# Patient Record
Sex: FEMALE | Race: WHITE | NOT HISPANIC OR LATINO
[De-identification: names, ages, dates, MRNs, and addresses within clinical notes are randomized per-mention and may not be internally consistent; named-entity substitution may affect disease eponyms.]

---

## 2017-03-22 ENCOUNTER — APPOINTMENT (OUTPATIENT)
Dept: ELECTROPHYSIOLOGY | Facility: CLINIC | Age: 47
End: 2017-03-22

## 2017-03-22 ENCOUNTER — NON-APPOINTMENT (OUTPATIENT)
Age: 47
End: 2017-03-22

## 2017-03-22 VITALS
HEART RATE: 85 BPM | HEIGHT: 60 IN | WEIGHT: 145 LBS | SYSTOLIC BLOOD PRESSURE: 108 MMHG | BODY MASS INDEX: 28.47 KG/M2 | DIASTOLIC BLOOD PRESSURE: 69 MMHG

## 2017-03-22 DIAGNOSIS — F17.200 NICOTINE DEPENDENCE, UNSPECIFIED, UNCOMPLICATED: ICD-10-CM

## 2017-03-22 RX ORDER — DOXYCYCLINE HYCLATE 100 MG/1
100 CAPSULE ORAL
Qty: 20 | Refills: 0 | Status: COMPLETED | COMMUNITY
Start: 2016-09-16

## 2017-03-22 RX ORDER — AZITHROMYCIN 250 MG/1
250 TABLET, FILM COATED ORAL
Qty: 6 | Refills: 0 | Status: COMPLETED | COMMUNITY
Start: 2016-10-27

## 2017-03-22 RX ORDER — CLINDAMYCIN HYDROCHLORIDE 300 MG/1
300 CAPSULE ORAL
Qty: 21 | Refills: 0 | Status: COMPLETED | COMMUNITY
Start: 2017-02-02

## 2017-03-22 RX ORDER — LEVOTHYROXINE SODIUM 100 UG/1
100 TABLET ORAL DAILY
Refills: 0 | Status: ACTIVE | COMMUNITY
Start: 2017-03-22

## 2017-03-22 RX ORDER — CLINDAMYCIN HYDROCHLORIDE 150 MG/1
150 CAPSULE ORAL
Qty: 28 | Refills: 0 | Status: COMPLETED | COMMUNITY
Start: 2017-03-17

## 2017-03-22 RX ORDER — IPRATROPIUM BROMIDE 21 UG/1
0.03 SPRAY NASAL
Qty: 30 | Refills: 0 | Status: COMPLETED | COMMUNITY
Start: 2016-10-27

## 2017-03-22 RX ORDER — MUPIROCIN 20 MG/G
2 OINTMENT TOPICAL
Qty: 22 | Refills: 0 | Status: COMPLETED | COMMUNITY
Start: 2016-09-16

## 2017-10-11 ENCOUNTER — APPOINTMENT (OUTPATIENT)
Dept: MRI IMAGING | Facility: CLINIC | Age: 47
End: 2017-10-11

## 2017-10-23 ENCOUNTER — OUTPATIENT (OUTPATIENT)
Dept: OUTPATIENT SERVICES | Facility: HOSPITAL | Age: 47
LOS: 1 days | End: 2017-10-23
Payer: MEDICAID

## 2017-10-23 VITALS
SYSTOLIC BLOOD PRESSURE: 98 MMHG | HEART RATE: 82 BPM | RESPIRATION RATE: 16 BRPM | DIASTOLIC BLOOD PRESSURE: 56 MMHG | HEIGHT: 60 IN | WEIGHT: 138.01 LBS | OXYGEN SATURATION: 97 % | TEMPERATURE: 99 F

## 2017-10-23 DIAGNOSIS — R55 SYNCOPE AND COLLAPSE: ICD-10-CM

## 2017-10-23 DIAGNOSIS — Z92.3 PERSONAL HISTORY OF IRRADIATION: Chronic | ICD-10-CM

## 2017-10-23 PROCEDURE — 33282: CPT

## 2017-10-23 PROCEDURE — C1764: CPT

## 2017-10-23 PROCEDURE — 93010 ELECTROCARDIOGRAM REPORT: CPT

## 2017-10-23 PROCEDURE — 93005 ELECTROCARDIOGRAM TRACING: CPT

## 2017-10-23 NOTE — H&P CARDIOLOGY - HISTORY OF PRESENT ILLNESS
This is a 45 yo woman with Graves disease, Anxiety who presents today for a loop recorder. The patient reports she has been experiencing palpitations over the last few months. She experiences cp, dizziness,  and HA with the  palpitations.   At times the palpitations last a few minutes and other for a few hours.  The patient denies syncope but has episodes of near syncope. She was seen and evaluated by her cardiologist Dr Cerna and event monitor and Holter monitor placed it showed  irreg rhythme. She is now here for a loop recorder This is a 45 yo woman with Graves disease with hx of Thyroid ablation ,  Anxiety who presents today for a loop recorder. The patient reports she has been experiencing palpitations over the last few months. She experiences cp, dizziness,  and HA with the  palpitations.   At times the palpitations last a few minutes and other for a few hours.  The patient denies syncope but has episodes of near syncope. She was seen and evaluated by her cardiologist Dr Cerna and event monitor and Holter monitor placed it showed  irreg rhythm as per patient and she was referred to Dr Reed for further evaluation  She is now here for a loop recorder

## 2017-10-23 NOTE — H&P CARDIOLOGY - PMH
Allergic Asthma    Anxiety Disorder    Blindness of One Eye  left  Grave's Disease    Hypothyroid    Missed     Obesity    Von Willebrand's Disease

## 2017-10-23 NOTE — H&P CARDIOLOGY - PSH
S/P Appendectomy    S/P Laparoscopic Cholecystectomy  delivery delivered    S/P Appendectomy    S/P Laparoscopic Cholecystectomy    Status post radioactive iodine thyroid ablation

## 2017-11-01 ENCOUNTER — APPOINTMENT (OUTPATIENT)
Dept: ELECTROPHYSIOLOGY | Facility: CLINIC | Age: 47
End: 2017-11-01
Payer: COMMERCIAL

## 2017-11-01 VITALS
OXYGEN SATURATION: 99 % | BODY MASS INDEX: 27.48 KG/M2 | SYSTOLIC BLOOD PRESSURE: 113 MMHG | WEIGHT: 140 LBS | HEART RATE: 73 BPM | DIASTOLIC BLOOD PRESSURE: 69 MMHG | HEIGHT: 60 IN

## 2017-11-01 PROCEDURE — 99024 POSTOP FOLLOW-UP VISIT: CPT

## 2017-12-12 ENCOUNTER — APPOINTMENT (OUTPATIENT)
Dept: ELECTROPHYSIOLOGY | Facility: CLINIC | Age: 47
End: 2017-12-12
Payer: COMMERCIAL

## 2017-12-12 PROCEDURE — 93298 REM INTERROG DEV EVAL SCRMS: CPT

## 2018-01-16 ENCOUNTER — APPOINTMENT (OUTPATIENT)
Dept: ELECTROPHYSIOLOGY | Facility: CLINIC | Age: 48
End: 2018-01-16
Payer: COMMERCIAL

## 2018-01-16 PROCEDURE — 93298 REM INTERROG DEV EVAL SCRMS: CPT

## 2018-02-13 ENCOUNTER — TRANSCRIPTION ENCOUNTER (OUTPATIENT)
Age: 48
End: 2018-02-13

## 2018-02-13 ENCOUNTER — APPOINTMENT (OUTPATIENT)
Dept: VASCULAR SURGERY | Facility: CLINIC | Age: 48
End: 2018-02-13
Payer: MEDICAID

## 2018-02-13 VITALS
HEART RATE: 70 BPM | WEIGHT: 140 LBS | HEIGHT: 60 IN | TEMPERATURE: 98.1 F | SYSTOLIC BLOOD PRESSURE: 117 MMHG | DIASTOLIC BLOOD PRESSURE: 76 MMHG | BODY MASS INDEX: 27.48 KG/M2

## 2018-02-13 PROCEDURE — 93970 EXTREMITY STUDY: CPT

## 2018-02-13 PROCEDURE — 99406 BEHAV CHNG SMOKING 3-10 MIN: CPT

## 2018-02-13 PROCEDURE — 99204 OFFICE O/P NEW MOD 45 MIN: CPT | Mod: 25

## 2018-02-14 ENCOUNTER — APPOINTMENT (OUTPATIENT)
Dept: ELECTROPHYSIOLOGY | Facility: CLINIC | Age: 48
End: 2018-02-14
Payer: COMMERCIAL

## 2018-02-14 VITALS
HEIGHT: 60 IN | BODY MASS INDEX: 27.88 KG/M2 | OXYGEN SATURATION: 99 % | DIASTOLIC BLOOD PRESSURE: 69 MMHG | HEART RATE: 72 BPM | SYSTOLIC BLOOD PRESSURE: 105 MMHG | WEIGHT: 142 LBS

## 2018-02-14 PROCEDURE — 93285 PRGRMG DEV EVAL SCRMS IP: CPT

## 2018-03-13 ENCOUNTER — APPOINTMENT (OUTPATIENT)
Dept: ELECTROPHYSIOLOGY | Facility: CLINIC | Age: 48
End: 2018-03-13
Payer: COMMERCIAL

## 2018-03-13 DIAGNOSIS — Z00.00 ENCOUNTER FOR GENERAL ADULT MEDICAL EXAMINATION W/OUT ABNORMAL FINDINGS: ICD-10-CM

## 2018-03-13 PROCEDURE — 93298 REM INTERROG DEV EVAL SCRMS: CPT

## 2018-04-17 ENCOUNTER — APPOINTMENT (OUTPATIENT)
Dept: ELECTROPHYSIOLOGY | Facility: CLINIC | Age: 48
End: 2018-04-17
Payer: COMMERCIAL

## 2018-04-17 PROCEDURE — 93298 REM INTERROG DEV EVAL SCRMS: CPT

## 2018-05-16 ENCOUNTER — APPOINTMENT (OUTPATIENT)
Dept: ELECTROPHYSIOLOGY | Facility: CLINIC | Age: 48
End: 2018-05-16
Payer: COMMERCIAL

## 2018-05-16 VITALS — OXYGEN SATURATION: 99 % | HEART RATE: 72 BPM | DIASTOLIC BLOOD PRESSURE: 73 MMHG | SYSTOLIC BLOOD PRESSURE: 113 MMHG

## 2018-05-16 PROCEDURE — 93285 PRGRMG DEV EVAL SCRMS IP: CPT

## 2018-05-31 ENCOUNTER — APPOINTMENT (OUTPATIENT)
Dept: ELECTROPHYSIOLOGY | Facility: CLINIC | Age: 48
End: 2018-05-31
Payer: COMMERCIAL

## 2018-05-31 PROCEDURE — 93298 REM INTERROG DEV EVAL SCRMS: CPT

## 2018-05-31 PROCEDURE — 93299: CPT

## 2018-07-30 ENCOUNTER — APPOINTMENT (OUTPATIENT)
Dept: ELECTROPHYSIOLOGY | Facility: CLINIC | Age: 48
End: 2018-07-30
Payer: COMMERCIAL

## 2018-07-30 PROCEDURE — 93299: CPT

## 2018-07-30 PROCEDURE — 93298 REM INTERROG DEV EVAL SCRMS: CPT

## 2018-08-02 PROBLEM — E03.9 HYPOTHYROIDISM, UNSPECIFIED: Chronic | Status: ACTIVE | Noted: 2017-10-23

## 2018-09-04 ENCOUNTER — APPOINTMENT (OUTPATIENT)
Dept: ELECTROPHYSIOLOGY | Facility: CLINIC | Age: 48
End: 2018-09-04
Payer: COMMERCIAL

## 2018-09-04 PROCEDURE — 93299: CPT

## 2018-09-04 PROCEDURE — 93298 REM INTERROG DEV EVAL SCRMS: CPT

## 2018-10-05 ENCOUNTER — APPOINTMENT (OUTPATIENT)
Dept: ELECTROPHYSIOLOGY | Facility: CLINIC | Age: 48
End: 2018-10-05
Payer: COMMERCIAL

## 2018-10-05 PROCEDURE — 93299: CPT

## 2018-10-05 PROCEDURE — 93298 REM INTERROG DEV EVAL SCRMS: CPT

## 2018-11-15 ENCOUNTER — APPOINTMENT (OUTPATIENT)
Dept: ELECTROPHYSIOLOGY | Facility: CLINIC | Age: 48
End: 2018-11-15
Payer: COMMERCIAL

## 2018-11-15 PROCEDURE — XXXXX: CPT

## 2018-12-05 ENCOUNTER — APPOINTMENT (OUTPATIENT)
Dept: OTOLARYNGOLOGY | Facility: CLINIC | Age: 48
End: 2018-12-05
Payer: MEDICAID

## 2018-12-05 VITALS
BODY MASS INDEX: 27.88 KG/M2 | HEART RATE: 83 BPM | WEIGHT: 142 LBS | SYSTOLIC BLOOD PRESSURE: 126 MMHG | HEIGHT: 60 IN | DIASTOLIC BLOOD PRESSURE: 72 MMHG

## 2018-12-05 DIAGNOSIS — H90.3 SENSORINEURAL HEARING LOSS, BILATERAL: ICD-10-CM

## 2018-12-05 DIAGNOSIS — M26.629 ARTHRALGIA OF TEMPOROMANDIBULAR JOINT,: ICD-10-CM

## 2018-12-05 DIAGNOSIS — J34.2 DEVIATED NASAL SEPTUM: ICD-10-CM

## 2018-12-05 DIAGNOSIS — J31.0 CHRONIC RHINITIS: ICD-10-CM

## 2018-12-05 DIAGNOSIS — H69.83 OTHER SPECIFIED DISORDERS OF EUSTACHIAN TUBE, BILATERAL: ICD-10-CM

## 2018-12-05 DIAGNOSIS — J32.9 CHRONIC SINUSITIS, UNSPECIFIED: ICD-10-CM

## 2018-12-05 DIAGNOSIS — J40 CHRONIC SINUSITIS, UNSPECIFIED: ICD-10-CM

## 2018-12-05 PROCEDURE — 92567 TYMPANOMETRY: CPT

## 2018-12-05 PROCEDURE — 92557 COMPREHENSIVE HEARING TEST: CPT

## 2018-12-05 PROCEDURE — 99204 OFFICE O/P NEW MOD 45 MIN: CPT | Mod: 25

## 2018-12-05 PROCEDURE — 31231 NASAL ENDOSCOPY DX: CPT

## 2018-12-05 RX ORDER — CETIRIZINE HCL 10 MG
TABLET ORAL
Refills: 0 | Status: ACTIVE | COMMUNITY

## 2018-12-31 ENCOUNTER — APPOINTMENT (OUTPATIENT)
Dept: OTOLARYNGOLOGY | Facility: CLINIC | Age: 48
End: 2018-12-31

## 2019-01-28 ENCOUNTER — APPOINTMENT (OUTPATIENT)
Dept: ELECTROPHYSIOLOGY | Facility: CLINIC | Age: 49
End: 2019-01-28
Payer: MEDICAID

## 2019-01-28 VITALS
SYSTOLIC BLOOD PRESSURE: 123 MMHG | BODY MASS INDEX: 28.47 KG/M2 | HEIGHT: 60 IN | WEIGHT: 145 LBS | OXYGEN SATURATION: 97 % | HEART RATE: 75 BPM | DIASTOLIC BLOOD PRESSURE: 72 MMHG

## 2019-01-28 PROCEDURE — 99214 OFFICE O/P EST MOD 30 MIN: CPT

## 2019-01-28 PROCEDURE — 93000 ELECTROCARDIOGRAM COMPLETE: CPT

## 2019-01-29 NOTE — PHYSICAL EXAM
[General Appearance - Well Developed] : well developed [General Appearance - Well Nourished] : well nourished [Normal Conjunctiva] : the conjunctiva exhibited no abnormalities [No Oral Pallor] : no oral pallor [No Oral Cyanosis] : no oral cyanosis [Normal Jugular Venous V Waves Present] : normal jugular venous V waves present [Respiration, Rhythm And Depth] : normal respiratory rhythm and effort [Exaggerated Use Of Accessory Muscles For Inspiration] : no accessory muscle use [Auscultation Breath Sounds / Voice Sounds] : lungs were clear to auscultation bilaterally [Heart Rate And Rhythm] : heart rate and rhythm were normal [Heart Sounds] : normal S1 and S2 [Murmurs] : no murmurs present [Abdomen Soft] : soft [Abdomen Tenderness] : non-tender [Abdomen Mass (___ Cm)] : no abdominal mass palpated [Abnormal Walk] : normal gait [Nail Clubbing] : no clubbing of the fingernails [Cyanosis, Localized] : no localized cyanosis [Skin Color & Pigmentation] : normal skin color and pigmentation [] : no rash [Oriented To Time, Place, And Person] : oriented to person, place, and time [No Anxiety] : not feeling anxious [FreeTextEntry1] : 2+ carotids

## 2019-01-29 NOTE — DISCUSSION/SUMMARY
[FreeTextEntry1] : Ms. Orozco is a 46 year old female with a history of thyroid disease and palpitations. Symptoms and findings on ILR are most consistent with a autonomic etiology, most likely POTS. I have recommended a trial of Inderal. She has had the ILR since 2017 with frequent symptoms but only findings of intermittent sinus tachycardia. As the device causes her continued discomfort I was agreeable to its removal. She will return for explant in the coming weeks.\par \par I appreciate being a part of her care.

## 2019-01-29 NOTE — HISTORY OF PRESENT ILLNESS
[FreeTextEntry1] : Dear Dr. Bullock, \par \par As you know, Ms. Orozco is a 46 year old female with a history of hyperthyroidism status post ablation and palpitations. She notes dizziness and pre-syncope with her palpitations. It happens while driving. She does not drink alcohol and has two cups of coffee in the morning. Walking triggers it sometimes. She often gets dizzy when standing up. She notes chest pain with the palpitations.. Her episodes last a few minutes at a time. It occurs 10-11 times a day. \par \par She underwent ILR implantation which shows only intermittent episodes of sinus tachycardia that correlate with her palpitations. She complains that the loop recorder causes her discomfort and is requesting that it be removed.

## 2019-03-05 ENCOUNTER — APPOINTMENT (OUTPATIENT)
Dept: ELECTROPHYSIOLOGY | Facility: CLINIC | Age: 49
End: 2019-03-05
Payer: MEDICAID

## 2019-03-05 PROCEDURE — XXXXX: CPT

## 2019-11-19 ENCOUNTER — TRANSCRIPTION ENCOUNTER (OUTPATIENT)
Age: 49
End: 2019-11-19

## 2019-11-19 ENCOUNTER — APPOINTMENT (OUTPATIENT)
Dept: ELECTROPHYSIOLOGY | Facility: CLINIC | Age: 49
End: 2019-11-19
Payer: MEDICAID

## 2019-11-19 ENCOUNTER — APPOINTMENT (OUTPATIENT)
Dept: ELECTROPHYSIOLOGY | Facility: CLINIC | Age: 49
End: 2019-11-19

## 2019-11-19 VITALS
HEIGHT: 60 IN | DIASTOLIC BLOOD PRESSURE: 72 MMHG | WEIGHT: 167 LBS | OXYGEN SATURATION: 97 % | HEART RATE: 75 BPM | BODY MASS INDEX: 32.79 KG/M2 | SYSTOLIC BLOOD PRESSURE: 113 MMHG

## 2019-11-19 DIAGNOSIS — R00.2 PALPITATIONS: ICD-10-CM

## 2019-11-19 DIAGNOSIS — I47.1 SUPRAVENTRICULAR TACHYCARDIA: ICD-10-CM

## 2019-11-19 PROCEDURE — 99214 OFFICE O/P EST MOD 30 MIN: CPT

## 2019-11-19 PROCEDURE — 93000 ELECTROCARDIOGRAM COMPLETE: CPT

## 2019-11-19 RX ORDER — PROPRANOLOL HYDROCHLORIDE 60 MG/1
60 CAPSULE, EXTENDED RELEASE ORAL AT BEDTIME
Qty: 30 | Refills: 5 | Status: DISCONTINUED | COMMUNITY
Start: 2019-01-28 | End: 2019-11-19

## 2019-11-19 NOTE — PHYSICAL EXAM
[General Appearance - Well Developed] : well developed [Normal Appearance] : normal appearance [Well Groomed] : well groomed [No Deformities] : no deformities [General Appearance - Well Nourished] : well nourished [General Appearance - In No Acute Distress] : no acute distress [Eyelids - No Xanthelasma] : the eyelids demonstrated no xanthelasmas [Normal Conjunctiva] : the conjunctiva exhibited no abnormalities [Normal Oral Mucosa] : normal oral mucosa [No Oral Cyanosis] : no oral cyanosis [No Oral Pallor] : no oral pallor [Normal Jugular Venous A Waves Present] : normal jugular venous A waves present [No Jugular Venous Norton A Waves] : no jugular venous norton A waves [Normal Jugular Venous V Waves Present] : normal jugular venous V waves present [Rhythm Regular] : regular [Normal] : normal [Normal Rate] : normal [Normal S2] : normal S2 [Normal S1] : normal S1 [Exaggerated Use Of Accessory Muscles For Inspiration] : no accessory muscle use [Respiration, Rhythm And Depth] : normal respiratory rhythm and effort [Auscultation Breath Sounds / Voice Sounds] : lungs were clear to auscultation bilaterally [Abdomen Soft] : soft [Abdomen Mass (___ Cm)] : no abdominal mass palpated [Abdomen Tenderness] : non-tender [Gait - Sufficient For Exercise Testing] : the gait was sufficient for exercise testing [Nail Clubbing] : no clubbing of the fingernails [Abnormal Walk] : normal gait [Petechial Hemorrhages (___cm)] : no petechial hemorrhages [Cyanosis, Localized] : no localized cyanosis [Skin Color & Pigmentation] : normal skin color and pigmentation [No Venous Stasis] : no venous stasis [] : no rash [Skin Lesions] : no skin lesions [No Xanthoma] : no  xanthoma was observed [No Skin Ulcers] : no skin ulcer [Mood] : the mood was normal [Affect] : the affect was normal [Oriented To Time, Place, And Person] : oriented to person, place, and time [No Anxiety] : not feeling anxious

## 2019-11-21 NOTE — HISTORY OF PRESENT ILLNESS
[FreeTextEntry1] : Dear Dr. Bullock, \par \par I saw Ms. Orozco in consultation on 11/19/19. \par She is a pleasant 48 yr old with past medical history of hyperthyroidism s/p ablation of the thyroid and palpitations s/p ILR ( 10/2017) . She reports frequent episodes of palpitations associated with dizziness, lightheadedness These episodes occur at all different times of the day and night without prodrome. These episodes lasts for a minute or two. She usually shakes her body to terminate the symptom. Interrogation of the ILR showed multiple brief AT with ventricular rates ~ 214 bpm lasting up to 2:56 seconds. \par

## 2019-11-21 NOTE — DISCUSSION/SUMMARY
[Patient] : the patient [FreeTextEntry1] : In summary, Ms. Acuña is a 48 yr old with past medical history of hyperthyroidism s/p ablation and palpitations s/p ILR ( 10/2017) . She reports frequent episodes of palpitations associated with dizziness, lightheadedness These episodes occur at all different times of the day and night without prodrome. These episodes lasts for a minute or two. She usually shakes her body to terminate the symptom. Interrogation of the ILR showed multiple brief AT with ventricular rates ~ 214 bpm lasting up to 2:56 seconds. \par Recommend a catheter ablation of the atrial tachycardia. \par The risks indications, alternatives to the procedure were discussed with the patient. Risks discussed include but not limited to : bleeding, infection, vascular injury and potential vascular surgery, myocardial infarction, disabling stroke, pericardiac tamponade, pneumothorax, deep vein thrombosis, , pulmonary embolism, emergent bypass and death.\par All of their questions were answered and  agrees to proceed with the procedure. \par \par I appreciate the opportunity to be involved in her care.\par \par Sincerely,\par \par Tyler Parker MD

## 2019-12-02 ENCOUNTER — NON-APPOINTMENT (OUTPATIENT)
Age: 49
End: 2019-12-02

## 2019-12-27 ENCOUNTER — APPOINTMENT (OUTPATIENT)
Dept: ELECTROPHYSIOLOGY | Facility: CLINIC | Age: 49
End: 2019-12-27

## 2020-09-28 PROBLEM — Z00.00 ENCOUNTER FOR PREVENTIVE HEALTH EXAMINATION: Noted: 2020-09-28

## 2020-10-02 ENCOUNTER — APPOINTMENT (OUTPATIENT)
Dept: ELECTROPHYSIOLOGY | Facility: CLINIC | Age: 50
End: 2020-10-02

## 2020-10-02 ENCOUNTER — APPOINTMENT (OUTPATIENT)
Dept: ELECTROPHYSIOLOGY | Facility: CLINIC | Age: 50
End: 2020-10-02
Payer: MEDICAID

## 2020-10-02 PROCEDURE — 99214 OFFICE O/P EST MOD 30 MIN: CPT | Mod: 95

## 2020-10-02 NOTE — PLAN
[FreeTextEntry1] : In summary Alejandra Orozco is a 49 yr old with past medical history of hyperthyroidism s/p ablation of the thyroid and palpitations with at least one episode of syncope and multiple episodes of near syncope s/p ILR (10/2017) and now she requested a Telehealth.  ILR has showed episodes of rapid PAT.  Ablation of the PAT was considered in past but not performed. Bystolic 5 mg resulting in hypotension in the past.  Will try metoprolol succinate 12.5 mg at night.  Also patient will have ILR replaced and moved to another location due to the device approaching ARTIS and current device causing pain.  I recommended that the patient wait until device reach EOL before replacing, but pain at the site might push her to having the device moved/replaced sooner.  Risks, benefits alternatives discussed. \par \par Tyler Parker MD

## 2020-10-02 NOTE — HISTORY OF PRESENT ILLNESS
[Home] : at home, [unfilled] , at the time of the visit. [Medical Office: ([facility])___] : at the medical office located in  [Verbal consent obtained from patient] : the patient, [unfilled] [Time Spent: ___ minutes] : I have spent [unfilled] minutes with the patient on the telephone [FreeTextEntry1] : Rick and Sterling Bullock MD\par \par Alejandra Orozco is a 49 yr old with past medical history of hyperthyroidism s/p ablation of the thyroid and palpitations s/p ILR ( 10/2017) . She reports frequent episodes of palpitations associated with dizziness, lightheadedness These episodes occur at all different times of the day and night without prodrome and last for a minute or two. She usually shakes her body to terminate the symptom. Interrogation of the ILR showed multiple brief AT with ventricular rates ~ 214 bpm lasting up to 2:56 seconds. She has a history of syncope about 2006 prior to thyroid ablation. Was leaving restaurant, had very bad palpitations and passed out. She notes that the ILR is painful and eventually wants a new one placed in another area.  Her symptoms of palpitations are usually preceded by headaches and she has seen a neurologist in the past.  She treats herself by placing something cold on herself and the symptoms resolve.  Has had recent episodes within the last week. Some of the episodes are associated with near syncope.  Starts with headache, followed by palpitations, body goes numb, followed by near syncope. The whole episode last about 3 minutes. Does hydrate with water, but does not drink alcohol. Does smoke occasionally. Two cups of coffee in AM.  No hot flashes. Tried beta blocker (Bystolic 5 mg) which caused hypotension.

## 2020-11-14 DIAGNOSIS — Z01.818 ENCOUNTER FOR OTHER PREPROCEDURAL EXAMINATION: ICD-10-CM

## 2020-11-15 ENCOUNTER — APPOINTMENT (OUTPATIENT)
Dept: DISASTER EMERGENCY | Facility: CLINIC | Age: 50
End: 2020-11-15

## 2020-11-18 ENCOUNTER — OUTPATIENT (OUTPATIENT)
Dept: OUTPATIENT SERVICES | Facility: HOSPITAL | Age: 50
LOS: 1 days | Discharge: HOME | End: 2020-11-18
Payer: MEDICAID

## 2020-11-18 DIAGNOSIS — Z92.3 PERSONAL HISTORY OF IRRADIATION: Chronic | ICD-10-CM

## 2020-11-18 DIAGNOSIS — I47.1 SUPRAVENTRICULAR TACHYCARDIA: ICD-10-CM

## 2020-11-18 LAB — HCG UR QL: NEGATIVE — SIGNIFICANT CHANGE UP

## 2020-11-18 PROCEDURE — 33286 RMVL SUBQ CAR RHYTHM MNTR: CPT | Mod: 59

## 2020-11-18 PROCEDURE — 33285 INSJ SUBQ CAR RHYTHM MNTR: CPT

## 2020-11-18 RX ORDER — LEVOTHYROXINE SODIUM 125 MCG
1 TABLET ORAL
Qty: 0 | Refills: 0 | DISCHARGE

## 2020-11-18 RX ORDER — METOPROLOL TARTRATE 50 MG
0.5 TABLET ORAL
Qty: 0 | Refills: 0 | DISCHARGE

## 2020-11-18 RX ORDER — CETIRIZINE HYDROCHLORIDE 10 MG/1
1 TABLET ORAL
Qty: 0 | Refills: 0 | DISCHARGE

## 2020-11-18 NOTE — H&P CARDIOLOGY - PSH
delivery delivered    S/P Appendectomy    S/P Laparoscopic Cholecystectomy    Status post radioactive iodine thyroid ablation     31-Mar-2018 04:46

## 2020-11-18 NOTE — H&P CARDIOLOGY - HISTORY OF PRESENT ILLNESS
49 y.o. female presents today for elective ILR explant and ILR implant. The patient c/o episodes of palpitations on and off since 2017, had ILR implanted at that time, however experiencing discomfort at the area and  would like it to be replaced   The patient claims that palpitations are on and off, can start at any time, associate with dizziness, headache. She denies syncope except for one syncopal episode in 2009. Recent ILR interrogation showed multiple brief AT with VR 214bpm lasting up to 2.56 sec.   The patient denies chest pain, SOB, syncope,  headache, visual disturbances, CVA, PE, DVT, KAM, abdominal pain, N/V/D/C, hematochezia, melena, dysuria, hematuria, fever, chills.

## 2020-11-18 NOTE — CHART NOTE - NSCHARTNOTEFT_GEN_A_CORE
Patient s/p ILR explant and new ILR implantation. Device teaching given to patient and she demonstrates understanding of the instructions.   F/U appointment with device clinic on 12/2/20 @ 9:15 am

## 2020-12-02 ENCOUNTER — APPOINTMENT (OUTPATIENT)
Dept: ELECTROPHYSIOLOGY | Facility: CLINIC | Age: 50
End: 2020-12-02

## 2021-02-14 LAB — SARS-COV-2 N GENE NPH QL NAA+PROBE: NOT DETECTED

## 2021-04-19 ENCOUNTER — APPOINTMENT (OUTPATIENT)
Dept: MRI IMAGING | Facility: CLINIC | Age: 51
End: 2021-04-19
Payer: MEDICAID

## 2021-04-19 ENCOUNTER — OUTPATIENT (OUTPATIENT)
Dept: OUTPATIENT SERVICES | Facility: HOSPITAL | Age: 51
LOS: 1 days | End: 2021-04-19
Payer: MEDICAID

## 2021-04-19 ENCOUNTER — APPOINTMENT (OUTPATIENT)
Dept: CT IMAGING | Facility: CLINIC | Age: 51
End: 2021-04-19
Payer: MEDICAID

## 2021-04-19 DIAGNOSIS — Z92.3 PERSONAL HISTORY OF IRRADIATION: Chronic | ICD-10-CM

## 2021-04-19 DIAGNOSIS — M43.16 SPONDYLOLISTHESIS, LUMBAR REGION: ICD-10-CM

## 2021-04-19 PROCEDURE — 72148 MRI LUMBAR SPINE W/O DYE: CPT | Mod: 26

## 2021-04-19 PROCEDURE — 72131 CT LUMBAR SPINE W/O DYE: CPT

## 2021-04-19 PROCEDURE — 72148 MRI LUMBAR SPINE W/O DYE: CPT

## 2021-04-19 PROCEDURE — 72131 CT LUMBAR SPINE W/O DYE: CPT | Mod: 26

## 2021-06-02 NOTE — H&P CARDIOLOGY - NEUROLOGICAL
Bilateral 7/14/2020    BILATERAL L3 4 5 MEDIAL NERVE BRANCH BLOCK # 2 performed by Henriette Bence, DO at [address] [place] [address]  11/01/2016    BRONCHOSCOPY  12/14/2016    CHOLECYSTECTOMY  02/25/2016    lap    COLONOSCOPY      ENDOMETRIAL ABLATION      ENDOSCOPY, COLON, DIAGNOSTIC      HYSTERECTOMY      PAIN MANAGEMENT PROCEDURE Left 7/28/2020    LEFT L3 4 5 MEDIAL NERVE BRANCH RADIOFREQUENCY ABLATION performed by Henriette Bence, DO at [facility] Right 8/27/2020    RIGHT L3 4 5 MEDIAL NERVE BRANCH RADIOFREQUENCY ABLATION performed by Henriette Bence, DO at [facility] Right 5/18/2021    RIGHT L3, 4, 5  RADIOFREQUENCY ABLATION performed by Henriette Bence, DO at [address] [place] [address]      related to endometriosis    MARCELINO AND BSO      TONSILLECTOMY         Prior to Admission medications    Medication Sig Start Date End Date Taking?  Authorizing Provider   sodium chloride POWD powder Take 1 g by mouth 3 times daily (with meals)   Yes Historical Provider, MD   Elastic Bandages & Supports (ABDOMINAL BINDER/ELASTIC LARGE) MISC 1 Device by Does not apply route daily 5/12/21  Yes Ure Lm Sanchez MD   propranolol (INDERAL) 20 MG tablet Take 1 tablet by mouth 3 times daily 5/12/21  Yes Nayeli Walker MD   cetirizine (ZYRTEC) 10 MG tablet TAKE 1 TABLET BY MOUTH DAILY 3/4/21  Yes Jignesh Marcano DO   montelukast (SINGULAIR) 10 MG tablet TAKE 1 TABLET BY MOUTH DAILY 2/1/21  Yes Darryn Mcgregor DO   Handicap [facility] [address] by Does not apply route Patient cannot walk 200 ft without stopping to rest.    Expiration 5 YRS 1/7/21  Yes Darryn Mcgregor DO   cimetidine (TAGAMET) 300 MG tablet Take 300 mg by mouth 2 times daily    Yes Historical Provider, MD   Acetaminophen (TYLENOL PO) Take by mouth   Yes Historical Provider, MD   lidocaine 4 % external patch Place 1 patch onto the skin daily   Yes Historical Provider, MD albuterol sulfate HFA (VENTOLIN HFA) 108 (90 Base) MCG/ACT inhaler Inhale 1 puff into the lungs every 4 hours as needed for Wheezing 7/22/20  Yes Jignesh Marcano, DO   folic acid (FOLVITE) 1 MG tablet Take 1 tablet by mouth daily 7/22/20  Yes Leo Leija, DO   medical marijuana Take by mouth as needed. Yes Historical Provider, MD   EMGALITY 120 MG/ML SOSY every 30 days  10/1/19  Yes Historical Provider, MD   VYVANSE 60 MG CAPS Take 60 mg by mouth daily. 8/3/19  Yes Historical Provider, MD   dexlansoprazole (DEXILANT) 60 MG CPDR delayed release capsule Take 1 capsule by mouth daily 12/17/18  Yes Josep Carranza,    alendronate (FOSAMAX) 70 MG tablet Take 70 mg by mouth   Yes Historical Provider, MD   estradiol (ESTRACE) 0.5 MG tablet Take 0.5 mg by mouth daily   Yes Historical Provider, MD   baclofen (LIORESAL) 20 MG tablet Take 20 mg by mouth 3 times daily   Yes Historical Provider, MD   PAROXETINE HCL PO Take 20 mg by mouth nightly    Yes Historical Provider, MD   LORazepam (ATIVAN) 1 MG tablet Take 1 mg by mouth 2 times daily  . 8/14/16  Yes Historical Provider, MD   diazePAM (VALIUM) 10 MG tablet Take one tab one hour prior to the procedure  Patient not taking: Reported on 6/3/2021 5/13/21 6/12/21  JUSTIN Grayson   guaiFENesin [facility]) 600 MG extended release tablet Take 1 tablet by mouth 2 times daily as needed for Congestion  Patient not taking: Reported on 6/3/2021 7/2/19   JESSE Gordillo - CNP   Probiotic Product (PROBIOTIC DAILY PO) Take 2 capsules by mouth every morning   Patient not taking: Reported on 6/3/2021    Historical Provider, MD       Allergies   Allergen Reactions    Codeine Hives    Demerol Hives    Morphine Hives     pt states that she has tolerated Dilaudid in the past w/o reaction (5/4/11)    Topiramate      Other reaction(s): Other: See Comments  heart palpitations    Tramadol Other (See Comments)     Other reaction(s):  Other: See Comments  also seizure  Casein Nausea And Vomiting    Eggs Or Egg-Derived Products Nausea And Vomiting    Gluten Meal Nausea And Vomiting    Nsaids      Other reaction(s): GI Upset  H/o ulcers    Peanuts [Peanut Oil] Nausea And Vomiting    Prochlorperazine Edisylate Nausea And Vomiting    Trazodone And Nefazodone Other (See Comments)     Nasal sinus swelling    Whey Nausea And Vomiting       Social History     Socioeconomic History    Marital status:      Spouse name: Not on file    Number of children: Not on file    Years of education: Not on file    Highest education level: Not on file   Occupational History    Not on file   Tobacco Use    Smoking status: Current Every Day Smoker     Packs/day: 0.50     Years: 15.00     Pack years: 7.50     Types: Cigarettes     Start date: 10/28/2004    Smokeless tobacco: Never Used    Tobacco comment: smokes 5-10 cigs on stressful days then can go week without   Vaping Use    Vaping Use: Never used   Substance and Sexual Activity    Alcohol use: No    Drug use: Yes     Types: Marijuana     Comment: medical - transdermal patch - edibles    Sexual activity: Not on file   Other Topics Concern    Not on file   Social History Narrative    Not on file     Social Determinants of Health     Financial Resource Strain:     Difficulty of Paying Living Expenses:    Food Insecurity:     Worried About Running Out of Food in the Last Year:     Ran Out of Food in the Last Year:    Transportation Needs:     Lack of Transportation (Medical):      Lack of Transportation (Non-Medical):    Physical Activity:     Days of Exercise per Week:     Minutes of Exercise per Session:    Stress:     Feeling of Stress :    Social Connections:     Frequency of Communication with Friends and Family:     Frequency of Social Gatherings with Friends and Family:     Attends Druze Services:     Active Member of Clubs or Organizations:     Attends Club or Organization Meetings:     Marital extremities    Neurological:    Sensory:normal to light touch BLE  Motor:                Right Quadriceps5/5          Left Quadriceps5/5           Right Gastrocnemius5/5    Left Gastrocnemius5/5  Right Ant Tibialis5/5  Left Ant Tibialis5/5    Reflexes:    Right Quadriceps reflex2+  Left Quadriceps reflex2+  Right Achilles reflex2+  Left Achilles reflex2+  Gait:normal station, with a quad cane    Dermatology:    Skin:no rashes or lesions noted     Impression:     LBP  Lumbar MRI shows DDD without sig central or foraminal stenosis  Referred by NSGY for procedures  Extensive PMHx - RLS, chronic migraines, seizures, rheumatoid arthritis, POTS, GERD, fibromyalgia, HTN, CHANNING, colitis, and a neuromuscular disorder  Hx SCS TRIAL for endometriosis which was complicated by a MRSA infection tx with PICC line abx  Has tried baclofen, lidocaine patches, and medical THC with moderate relief      Plan:  Urine screen deferred  O[facility]RS report reviewed  Continue diclofenac gel - pt requests prescription for insurance  Right L3,4,5 RFA with 60% relief, scheduled for the left (valium for presedation, needs )  Consider b/l SIJ injection prn  PT - continue, neg leg length discrep. Patient encouraged to stay active and to lose weight  Treatment plan discussed with the patient including medications and procedure side effects     We discussed with the patient that combining opioids, benzodiazepines, alcohol, illicit drugs or sleep aids increases the risk of respiratory depression including death. We discussed that these medications may cause drowsiness, sedation or dizziness and have counseled the patient not to drive or operate machinery. We have discussed that these medications will be prescribed only by one provider. We have discussed with the patient about age related risk factors and have thoroughly discussed the importance of taking these medications as prescribed. The patient verbalizes understanding.     Cc:  Referring physician    KIM Tilley. Alert & oriented; no sensory, motor or coordination deficits, normal reflexes details…

## 2022-01-07 NOTE — H&P CARDIOLOGY - URINARY CATHETER
SPORTS MEDICINE AND PRIMARY CARE  Charleen Belle. MD L[place]  [address] [address]    Chief Complaint   Patient presents with    Medication Evaluation     medication follow up, Celexa. Pt states she is doing good on the medication, she just does not like the weight gain. SUBJECTIVE:    Houston Golden is a 35 y.o. female for follow-up evaluation. Elevated blood pressure in the dentist office in December. Home blood pressure average has been 141/89. Blood pressure is elevated in the office today on first determination. No headache, vision changes, dizziness or chest pain     No hx of elev bp    family hx htn parents    Denies white coat sx    Past medical history of cerebral palsy, depression and anemia. Patient reports weight gain on SSRI. Hemoglobin in January was 13.6. Current Outpatient Medications   Medication Sig Dispense Refill   Number  citalopram (CELEXA) 20 mg tablet Take 1 Tablet by mouth daily. 90 Tablet 1    ferrous sulfate (SLOW FE) 142 mg (45 mg iron) ER tablet Take 1 Tablet by mouth Daily (before breakfast).  90 Tablet 0   Will  Past Medical History:   Diagnosis Date    Cerebral palsy ([facility] [address]      Past Surgical History:   Procedure Laterality Date    HX ORTHOPAEDIC       No Known Allergies    REVIEW OF SYSTEMS:  General: negative for - chills or fever  ENT: negative for -headaches, nasal congestion, tinnitus, hearing loss, vision changes, sore throat  Respiratory: negative for - cough, hemoptysis, shortness of breath or wheezing  Cardiovascular : negative for - chest pain, edema, palpitations or shortness of breath  Gastrointestinal: negative for - abdominal pain, blood in stools, heartburn or nausea/vomiting, diarrhea, constipation  Genito-Urinary: no dysuria, trouble voiding, hematuria or erectile dysfunction  Musculoskeletal: negative for - gait disturbance, joint pain, joint stiffness , joint swelling, muscle aches  Neurological: + Cerebral palsy no TIA or stroke symptoms  Hematologic:+anemia ; no bruises, no bleeding, no swollen glands  Integument: no lumps, mole changes, nail changes or rash  Endocrine: unexpected weight changes; no malaise/lethargy or      Social History     Socioeconomic History    Marital status: SINGLE   Tobacco Use    Smoking status: Never Smoker    Smokeless tobacco: Never Used   Substance and Sexual Activity    Alcohol use: Never    Drug use: Never    Sexual activity: Not Currently     Family History   Problem Relation Age of Onset    Cancer Mother     Hypertension Mother        OBJECTIVE:     Visit Vitals  /82   Pulse 91   Temp 97.6 °F (36.4 °C) (Oral)   Ht 5' 2\" (1.575 m)   Wt 134 lb 6.4 oz (61 kg)   LMP 01/01/2022   SpO2 96%   BMI 24.58 kg/m²     CONSTITUTIONAL:  appears in usual state of health  EYES:  eom intact  NECK: supple. COR:rrr  RESPIRATORY: Chest: clear bilaterally  INTEGUMENT: warm and dry  NEUROLOGIC: non-focal exam   MENTAL STATUS: alert. appropriate affect       ASSESSMENT/PLAN:  1. Elevated blood-pressure reading without diagnosis of hypertension    2. Elevated glucose    3. Iron deficiency anemia, unspecified iron deficiency anemia type    4. Prescription medication started    5. Depression, unspecified depression type    6. Encounter for long-term (current) use of other medications      Elevated blood pressure consistent with essential hypertension. Initiate HCTZ. Encourage lifestyle modification. Elevated glucose on a routine lab. Check A1c  Iron deficiency anemia managed with iron supplement. Continue current management. Depression is stable. Continue SSRI. Patient is concerned about weight gain. .  Orders Placed This Encounter    CBC WITH AUTOMATED DIFF    IRON    FERRITIN    HEMOGLOBIN A1C WITH EAG    hydroCHLOROthiazide (HYDRODIURIL) 12.5 mg tablet       Follow-up and Dispositions    · Return in about 4 weeks (around 2/4/2022) for medication folllow up.          I have discussed the diagnosis with the patient and the intended plan as seen in the  orders above. The patient understands and agrees with the plan. The patient has   received an after visit summary. Questions were answered concerning  future plans  Patient labs and/or xrays were reviewed as available. Past records were reviewed as available. Counseled regarding  healthy lifestyle       Advised patient to call back or return to office if symptoms develop/worsen/change/persist.  Discussed expected course/resolution/complications of diagnosis in detail with patient. Medication risks/benefits/costs/interactions/alternatives discussed with patient    Qasim Bingham M.D. This note was created using voice recognition software.   Edits have been made but syntax errors might exist. no

## 2022-04-29 ENCOUNTER — APPOINTMENT (OUTPATIENT)
Dept: ORTHOPEDIC SURGERY | Facility: CLINIC | Age: 52
End: 2022-04-29
Payer: MEDICAID

## 2022-04-29 VITALS
DIASTOLIC BLOOD PRESSURE: 75 MMHG | HEIGHT: 60 IN | HEART RATE: 78 BPM | BODY MASS INDEX: 28.47 KG/M2 | SYSTOLIC BLOOD PRESSURE: 117 MMHG | WEIGHT: 145 LBS

## 2022-04-29 DIAGNOSIS — M54.16 RADICULOPATHY, LUMBAR REGION: ICD-10-CM

## 2022-04-29 DIAGNOSIS — M70.61 TROCHANTERIC BURSITIS, RIGHT HIP: ICD-10-CM

## 2022-04-29 DIAGNOSIS — M25.551 PAIN IN RIGHT HIP: ICD-10-CM

## 2022-04-29 PROCEDURE — 99204 OFFICE O/P NEW MOD 45 MIN: CPT

## 2022-04-29 PROCEDURE — 72100 X-RAY EXAM L-S SPINE 2/3 VWS: CPT

## 2022-04-29 PROCEDURE — 73502 X-RAY EXAM HIP UNI 2-3 VIEWS: CPT | Mod: RT

## 2022-05-24 ENCOUNTER — APPOINTMENT (OUTPATIENT)
Dept: VASCULAR SURGERY | Facility: CLINIC | Age: 52
End: 2022-05-24
Payer: MEDICAID

## 2022-05-24 VITALS
DIASTOLIC BLOOD PRESSURE: 67 MMHG | SYSTOLIC BLOOD PRESSURE: 103 MMHG | TEMPERATURE: 96.3 F | HEIGHT: 60 IN | HEART RATE: 77 BPM | BODY MASS INDEX: 27.48 KG/M2 | WEIGHT: 140 LBS

## 2022-05-24 PROCEDURE — 99203 OFFICE O/P NEW LOW 30 MIN: CPT

## 2022-05-24 PROCEDURE — 93970 EXTREMITY STUDY: CPT

## 2022-05-24 RX ORDER — METOPROLOL SUCCINATE 25 MG/1
25 TABLET, EXTENDED RELEASE ORAL AT BEDTIME
Qty: 15 | Refills: 0 | Status: DISCONTINUED | COMMUNITY
Start: 2020-10-02 | End: 2022-05-24

## 2022-05-24 RX ORDER — DICLOFENAC SODIUM 50 MG/1
50 TABLET, DELAYED RELEASE ORAL
Qty: 20 | Refills: 0 | Status: DISCONTINUED | COMMUNITY
Start: 2022-04-29 | End: 2022-05-24

## 2022-05-24 RX ORDER — PAROXETINE HYDROCHLORIDE 20 MG/1
20 TABLET, FILM COATED ORAL
Refills: 0 | Status: ACTIVE | COMMUNITY

## 2022-05-24 NOTE — ASSESSMENT
[FreeTextEntry1] : BINH JEFFREY is a 51 year old female presents for evaluation of right lower extremity pain and varicose veins.\par \par > Venous insufficiency, CEAP C2\par –Reviewed results of duplex with patient.  There is mild to venous insufficiency at the saphenofemoral junction of the right leg only.  There are varicose veins measuring up to 2.5 mm years.  Reviewed results of duplex with patient.\par – Discussed management options with patient including stab phlebectomy for treatment of painful varicose veins.\par – However, given patient's extensive allergy profile including lidocaine, recommend evaluation by allergist prior to proceeding with any procedure.

## 2022-05-24 NOTE — PHYSICAL EXAM
[2+] : left 2+ [Ankle Swelling Bilaterally] : bilaterally  [Varicose Veins Of Lower Extremities] : present [Varicose Veins Of The Right Leg] : of the right leg [Ankle Swelling On The Left] : moderate [Calm] : calm [Ankle Swelling (On Exam)] : not present [] : not present [de-identified] : Well-appearing  [de-identified] : EOMI, anicteric  [de-identified] : soft, nt, nd  [de-identified] : motor and sensation intact in all four extremities  [de-identified] : no wounds on bilateral feet, brisk cap refill bilateral toes [de-identified] : A&Ox4

## 2022-05-24 NOTE — HISTORY OF PRESENT ILLNESS
[FreeTextEntry1] : BINH JEFFREY is a 51 year old female who presents for evaluation of varicose legs. \par \par Patient states that she had lower extremity varicose veins for several years. The symptoms are worse on the right leg. Reports leg swelling, particularly at the end of the day. The patient reports pain at the varicose vein site but also in the calf. The pain is particularly worse at night. She has previously tried compression stockings but had to stop due to increased pain. No family history of varicose veins. No personal or family history of DVT. \par \par + PMH: von Willebrand's, rheumatoid arthritis, graves disease, atrial fibrillation w/ loop monitor\par + PSH: s/p , s/p appendectomy, s/p cholecystectomy\par + FH: rheumatoid arthritis\par + SH: current daily smoker (1/2 ppd), no etoh use\par + Aller: augmentin, cefdinr, codeine, lidocaine, epinephrine, pcn, sulfa\par \par PCP is Dr. Rick Bullock. \par Rheumatologist is Dr. Davide Nguyen.

## 2022-05-24 NOTE — DATA REVIEWED
[FreeTextEntry1] : 5/24/2022-bilateral lower extremity venous duplex\par Negative for DVT or SVT bilaterally.\par Right–there is reflux in the SF J only.  There is a dilated popliteal vein with stagnant flow.  There are varicosities on the anterior leg measuring up to 2.5 mm.\par Left–there is no evidence of reflux.

## 2022-05-24 NOTE — CONSULT LETTER
[Dear  ___] : Dear  [unfilled], [Consult Letter:] : I had the pleasure of evaluating your patient, [unfilled]. [Please see my note below.] : Please see my note below. [Consult Closing:] : Thank you very much for allowing me to participate in the care of this patient.  If you have any questions, please do not hesitate to contact me. [Sincerely,] : Sincerely, [FreeTextEntry1] : She presented for evaluation of painful right varicose veins.  On exam, she had palpable lower extremity pulses without evidence of arterial insufficiency.  She had significant varicose veins of the right lower extremity that were tender to palpation.  A venous duplex revealed no deep venous thrombosis or superficial venous thrombophlebitis.  She had mild venous insufficiency of the right saphenofemoral junction only.  She also had varicose veins measuring up to 2.5 mm.  I discussed with her management options including stab phlebectomy versus conservative management.  She stated that she would like to undergo procedure.  However, given her extensive allergy history, including to lidocaine, I recommended evaluation by allergist prior to proceeding with any procedure. [FreeTextEntry3] : \par \par \par \par Alejandra Malik MD, RPVI\par Division of Vascular & Endovascular Surgery\par [facility], Department of Surgery

## 2023-06-20 ENCOUNTER — NON-APPOINTMENT (OUTPATIENT)
Age: 53
End: 2023-06-20

## 2023-06-21 ENCOUNTER — APPOINTMENT (OUTPATIENT)
Dept: VASCULAR SURGERY | Facility: CLINIC | Age: 53
End: 2023-06-21
Payer: MEDICAID

## 2023-06-21 VITALS
HEIGHT: 60 IN | DIASTOLIC BLOOD PRESSURE: 68 MMHG | BODY MASS INDEX: 31.61 KG/M2 | WEIGHT: 161 LBS | SYSTOLIC BLOOD PRESSURE: 110 MMHG | OXYGEN SATURATION: 97 % | HEART RATE: 74 BPM

## 2023-06-21 PROCEDURE — 99212 OFFICE O/P EST SF 10 MIN: CPT

## 2023-06-21 NOTE — ASSESSMENT
[FreeTextEntry1] : BINH JEFFREY is a 52 year old female presents for evaluation of right lower extremity pain and varicose veins.\par \par > Venous insufficiency, CEAP C2\par - Will repeat venous reflux studies for evaluation for persistent painful varicose veins. \par - Patient has previously tried compression stockings but could not tolerate due to pain.\par - Follow up after above studies completed.

## 2023-06-21 NOTE — PHYSICAL EXAM
[2+] : left 2+ [Varicose Veins Of Lower Extremities] : present [Ankle Swelling Bilaterally] : bilaterally  [Varicose Veins Of The Right Leg] : of the right leg [Ankle Swelling On The Left] : moderate [Calm] : calm [Ankle Swelling (On Exam)] : not present [] : not present [de-identified] : Well-appearing  [de-identified] : EOMI, anicteric  [de-identified] : soft, nt, nd  [de-identified] : motor and sensation intact in all four extremities  [de-identified] : no wounds on bilateral feet, brisk cap refill bilateral toes [de-identified] : A&Ox4

## 2023-06-21 NOTE — HISTORY OF PRESENT ILLNESS
[FreeTextEntry1] : BINH JEFFREY is a 51 year old female who presents for evaluation of varicose legs. \par \par Patient states that she had lower extremity varicose veins for several years. The symptoms are worse on the right leg. Reports leg swelling, particularly at the end of the day. The patient reports pain at the varicose vein site but also in the calf. The pain is particularly worse at night. She has previously tried compression stockings but had to stop due to increased pain. No family history of varicose veins. No personal or family history of DVT. \par \par + PMH: von Willebrand's, rheumatoid arthritis, graves disease, atrial fibrillation w/ loop monitor\par + PSH: s/p , s/p appendectomy, s/p cholecystectomy\par + FH: rheumatoid arthritis\par + SH: current daily smoker (1/2 ppd), no etoh use\par + Aller: augmentin, cefdinr, codeine, lidocaine, epinephrine, pcn, sulfa\par \par PCP is Dr. Rick Bullock. \par Rheumatologist is Dr. Davide Nguyen.  [de-identified] : 6/21/2023 - Pt presents for follow up. Continues to have pain in the right calf overlying the varicose veins. Since our last visit, patient has undergone office based procedure with lidocaine without any issues. Additionally, did follow up with allergist who stated no real allergy to lidocaine.

## 2023-06-27 ENCOUNTER — APPOINTMENT (OUTPATIENT)
Dept: VASCULAR SURGERY | Facility: CLINIC | Age: 53
End: 2023-06-27
Payer: MEDICAID

## 2023-06-27 PROCEDURE — 93970 EXTREMITY STUDY: CPT

## 2023-06-30 ENCOUNTER — NON-APPOINTMENT (OUTPATIENT)
Age: 53
End: 2023-06-30

## 2023-07-11 ENCOUNTER — NON-APPOINTMENT (OUTPATIENT)
Age: 53
End: 2023-07-11

## 2023-08-08 ENCOUNTER — APPOINTMENT (OUTPATIENT)
Dept: VASCULAR SURGERY | Facility: CLINIC | Age: 53
End: 2023-08-08
Payer: MEDICAID

## 2023-08-08 PROCEDURE — 36475 ENDOVENOUS RF 1ST VEIN: CPT | Mod: RT

## 2023-08-08 RX ORDER — LORAZEPAM 1 MG/1
1 TABLET ORAL
Qty: 90 | Refills: 0 | Status: COMPLETED | COMMUNITY
Start: 2017-01-09 | End: 2023-08-08

## 2023-08-15 ENCOUNTER — APPOINTMENT (OUTPATIENT)
Dept: VASCULAR SURGERY | Facility: CLINIC | Age: 53
End: 2023-08-15
Payer: MEDICAID

## 2023-08-15 PROCEDURE — 93971 EXTREMITY STUDY: CPT | Mod: RT

## 2023-09-05 RX ORDER — LIDOCAINE HYDROCHLORIDE 10 MG/ML
1 INJECTION, SOLUTION INFILTRATION; PERINEURAL
Qty: 50 | Refills: 0 | Status: DISCONTINUED | COMMUNITY
Start: 2023-08-02 | End: 2023-09-05

## 2023-09-05 RX ORDER — SODIUM BICARBONATE 84 MG/ML
8.4 INJECTION, SOLUTION INTRAVENOUS
Qty: 5 | Refills: 0 | Status: DISCONTINUED | COMMUNITY
Start: 2023-08-02 | End: 2023-09-05

## 2023-09-12 ENCOUNTER — APPOINTMENT (OUTPATIENT)
Dept: VASCULAR SURGERY | Facility: CLINIC | Age: 53
End: 2023-09-12
Payer: MEDICAID

## 2023-09-12 PROCEDURE — 36475 ENDOVENOUS RF 1ST VEIN: CPT | Mod: RT

## 2023-09-19 ENCOUNTER — APPOINTMENT (OUTPATIENT)
Dept: VASCULAR SURGERY | Facility: CLINIC | Age: 53
End: 2023-09-19
Payer: MEDICAID

## 2023-09-19 PROCEDURE — 93971 EXTREMITY STUDY: CPT | Mod: LT

## 2023-10-09 ENCOUNTER — APPOINTMENT (OUTPATIENT)
Dept: VASCULAR SURGERY | Facility: CLINIC | Age: 53
End: 2023-10-09
Payer: MEDICAID

## 2023-10-09 PROCEDURE — 99441: CPT

## 2023-10-10 ENCOUNTER — APPOINTMENT (OUTPATIENT)
Dept: VASCULAR SURGERY | Facility: CLINIC | Age: 53
End: 2023-10-10

## 2023-11-14 ENCOUNTER — APPOINTMENT (OUTPATIENT)
Dept: VASCULAR SURGERY | Facility: CLINIC | Age: 53
End: 2023-11-14

## 2024-02-23 ENCOUNTER — APPOINTMENT (OUTPATIENT)
Dept: ORTHOPEDIC SURGERY | Facility: CLINIC | Age: 54
End: 2024-02-23

## 2024-06-12 ENCOUNTER — APPOINTMENT (OUTPATIENT)
Dept: VASCULAR SURGERY | Facility: CLINIC | Age: 54
End: 2024-06-12
Payer: MEDICAID

## 2024-06-12 VITALS
BODY MASS INDEX: 31.8 KG/M2 | WEIGHT: 162 LBS | SYSTOLIC BLOOD PRESSURE: 102 MMHG | TEMPERATURE: 98.2 F | DIASTOLIC BLOOD PRESSURE: 65 MMHG | HEIGHT: 60 IN | HEART RATE: 76 BPM | RESPIRATION RATE: 14 BRPM | OXYGEN SATURATION: 97 %

## 2024-06-12 DIAGNOSIS — I83.891 VARICOSE VEINS OF RIGHT LOWER EXTREMITY WITH OTHER COMPLICATIONS: ICD-10-CM

## 2024-06-12 PROCEDURE — 99213 OFFICE O/P EST LOW 20 MIN: CPT

## 2024-06-12 RX ORDER — METOPROLOL SUCCINATE 25 MG/1
25 TABLET, EXTENDED RELEASE ORAL AT BEDTIME
Qty: 15 | Refills: 2 | Status: DISCONTINUED | COMMUNITY
Start: 2024-04-16 | End: 2024-06-12

## 2024-06-12 RX ORDER — OXYCODONE HYDROCHLORIDE AND ACETAMINOPHEN 10; 325 MG/1; MG/1
TABLET ORAL
Refills: 0 | Status: DISCONTINUED | COMMUNITY
End: 2024-06-12

## 2024-06-12 RX ORDER — SODIUM BICARBONATE 84 MG/ML
8.4 INJECTION, SOLUTION INTRAVENOUS
Qty: 5 | Refills: 0 | Status: DISCONTINUED | COMMUNITY
Start: 2023-09-05 | End: 2024-06-12

## 2024-06-12 RX ORDER — LIDOCAINE HYDROCHLORIDE 10 MG/ML
1 INJECTION, SOLUTION INFILTRATION; PERINEURAL
Qty: 50 | Refills: 0 | Status: DISCONTINUED | COMMUNITY
Start: 2023-09-05 | End: 2024-06-12

## 2024-06-12 NOTE — ASSESSMENT
[FreeTextEntry1] : BINH JEFFREY is a 53 year old female presents for evaluation of right lower extremity pain and varicose veins.  > Venous insufficiency, CEAP C2 - s/p bilateral GSV RFA in 2023 - Now with recurrent right leg varicose veins and pain.  - Will repeat venous reflux for evaluation.

## 2024-06-12 NOTE — HISTORY OF PRESENT ILLNESS
[FreeTextEntry1] : BINH JEFFREY is a 51 year old female who presents for evaluation of varicose legs.   Patient states that she had lower extremity varicose veins for several years. The symptoms are worse on the right leg. Reports leg swelling, particularly at the end of the day. The patient reports pain at the varicose vein site but also in the calf. The pain is particularly worse at night. She has previously tried compression stockings but had to stop due to increased pain. No family history of varicose veins. No personal or family history of DVT.   + PMH: von Willebrand's, rheumatoid arthritis, graves disease, atrial fibrillation w/ loop monitor + PSH: s/p , s/p appendectomy, s/p cholecystectomy + FH: rheumatoid arthritis + SH: current daily smoker (1/2 ppd), no etoh use + Aller: augmentin, cefdinr, codeine, lidocaine, epinephrine, pcn, sulfa  PCP is Dr. Rick Bullock.  Rheumatologist is Dr. Davide Nguyen.   2023 - Pt presents for follow up. Continues to have pain in the right calf overlying the varicose veins. Since our last visit, patient has undergone office based procedure with lidocaine without any issues. Additionally, did follow up with allergist who stated no real allergy to lidocaine.   2023 - right GSV RFA 2023 - left GSV RFA  10/9/2023 - Pt presents for follow up after ablation procedures. She reports that her legs feel much better. She does not have the leg cramping at nighttime any more. She is able to sleep at night. Has persistent leg varicose veins with some discomfort.  [de-identified] : 6/12/2024 - Pt presents for follow up. Initially, improvement in her leg symptoms. More recently, worsening right leg varicose veins and with that, recurrence of right leg pain. Reports continued right leg discomfort with nighttime cramping. There is decreased swelling of the legs but has persistent varicose veins with pain. She describes having pain over the entire leg. Does not use compression stockings due to worsening pain with the compression. Patient also with chronic back pain and was previously advised for back surgery but patient has declined previously.

## 2024-06-12 NOTE — DATA REVIEWED
[FreeTextEntry1] : 5/24/2022-bilateral lower extremity venous duplex\par  Negative for DVT or SVT bilaterally.\par  Right-there is reflux in the SF J only.  There is a dilated popliteal vein with stagnant flow.  There are varicosities on the anterior leg measuring up to 2.5 mm.\par  Left-there is no evidence of reflux.

## 2024-06-12 NOTE — PHYSICAL EXAM
[Respiratory Effort] : normal respiratory effort [Calm] : calm [2+] : left 2+ [Ankle Swelling (On Exam)] : not present [Varicose Veins Of Lower Extremities] : present [Varicose Veins Of The Right Leg] : of the right leg [Ankle Swelling On The Left] : moderate [] : not present [de-identified] : Well-appearing  [de-identified] : EOMI, anicteric  [de-identified] : soft, nt, nd  [de-identified] : moves all extremities  [de-identified] : no wounds on bilateral feet [de-identified] : A&Ox4

## 2024-06-13 ENCOUNTER — TRANSCRIPTION ENCOUNTER (OUTPATIENT)
Age: 54
End: 2024-06-13

## 2024-07-03 ENCOUNTER — APPOINTMENT (OUTPATIENT)
Dept: VASCULAR SURGERY | Facility: CLINIC | Age: 54
End: 2024-07-03
Payer: MEDICAID

## 2024-07-03 PROCEDURE — 93970 EXTREMITY STUDY: CPT

## 2024-07-22 ENCOUNTER — APPOINTMENT (OUTPATIENT)
Dept: VASCULAR SURGERY | Facility: CLINIC | Age: 54
End: 2024-07-22

## 2024-08-01 RX ORDER — ALPRAZOLAM 0.5 MG/1
0.5 TABLET ORAL
Qty: 1 | Refills: 0 | Status: COMPLETED | COMMUNITY
Start: 2024-08-01 | End: 2024-08-06

## 2024-08-06 ENCOUNTER — APPOINTMENT (OUTPATIENT)
Dept: VASCULAR SURGERY | Facility: CLINIC | Age: 54
End: 2024-08-06

## 2024-08-06 PROCEDURE — 36475 ENDOVENOUS RF 1ST VEIN: CPT | Mod: RT

## 2024-08-06 NOTE — PROCEDURE
[FreeTextEntry1] : RIGHT AGSV RFA [FreeTextEntry3] : Procedural safety checklist and time out completed: Confirmed patient identification (Patient Name, , and/or medical record number including, when possible, affirmation by patient or parent/family/other). Confirmed procedure with the patient. Consent present, accurate and signed. Confirmed special equipment and supplies are present. Sterility confirmed. Position verified. Site/ side is marked and visible and confirmed. Procedure confirmed by consent. Accurate consent including side and site. Review of medical records, including venous ultrasound, noting correct procedure including site and side. MD/PA verifies presence and review of imaging studies and or written report of imaging studies. Agreement on the procedure to be performed. Time out completed. All of the above has been confirmed by the team. All patient-specific concerns have been addressed.   Indication: Right lower extremity varicose veins with inflammation, leg pain, leg swelling, and leg cramping.  Venous insufficiency/ reflux.   Procedure: radiofrequency ablation of the right anterior great saphenous vein.   Ms. BINH JEFFREY is a 53 year old F with a history of bilateral lower extremity varicose veins previously seen in the office.  Ultrasound examination demonstrated venous insufficiency. A trial of compression stockings, exercise, elevation, and pain medication was attempted without relief and definitive treatment with radiofrequency ablation was offered.   The patient has come for radiofrequency ablation treatment of the right anterior great saphenous vein. I have discussed the risks of the procedure at length with the patient. The risks discussed were inclusive of but not limited to infection, irritation at the site of infiltration of local anesthesia, and also rare risk of deep venous thrombosis and pulmonary emboli. The patient agrees to proceed with the procedure. The patient was escorted into the procedure room and a time out called. The entire limb was prepped and draped in sterile fashion. The RF fiber was placed on the sterile field and connected by a sterile cable. Actuation, temperature, and impedance testing were performed to ensure that all components were connected and operating properly. The patient was placed on the procedure table and local anesthesia was instilled in the skin overlying the access site. Under ultrasound guidance, the vein was punctured with a micropuncture needle, using the anterior wall technique. A guide wire was now introduced through the needle, and the needle was then exchanged over the guide wire for a 7F sheath. The guide wire was removed, and the RF probe was then placed into the saphenous vein through the sheath and position confirmed using ultrasound guidance. After the RF probe position was verified by ultrasound, tumescent anesthesia consisting of normal saline and 1% lidocaine was infiltrated, under ultrasound guidance, precisely into the perivenous compartment along the entire length of the vein until a halo of fluid was noted around the vein. After RF probe position was again confirmed with ultrasound imaging, RF energy was applied. The probe was gradually and carefully withdrawn at a rate of 6.5cm/20seconds.   2 cycles of RF performed using the 7 cm probe. Total treatment time was 40 seconds. The total volume injected was 250 cc. Treatment length was 7 cm and The probe is 3.5 cm from the SFJ.   Estimated Blood Loss: minimal. Repeat ultrasound of the treated vein was performed confirming successful treatment. The catheter and sheath were withdrawn, and hemostasis established with direct pressure. After assuring hemostasis, a sterile 4x4 was placed on the access site and an ACE compression wrap was applied. Patient tolerated procedure well. Patient was given post-procedure instructions and follow up appointment was scheduled.

## 2024-08-12 ENCOUNTER — APPOINTMENT (OUTPATIENT)
Dept: VASCULAR SURGERY | Facility: CLINIC | Age: 54
End: 2024-08-12
Payer: MEDICAID

## 2024-08-12 PROCEDURE — 93971 EXTREMITY STUDY: CPT

## 2024-08-27 ENCOUNTER — APPOINTMENT (OUTPATIENT)
Dept: VASCULAR SURGERY | Facility: CLINIC | Age: 54
End: 2024-08-27
Payer: MEDICAID

## 2024-08-27 VITALS — OXYGEN SATURATION: 97 % | DIASTOLIC BLOOD PRESSURE: 72 MMHG | HEART RATE: 75 BPM | SYSTOLIC BLOOD PRESSURE: 109 MMHG

## 2024-08-27 VITALS
DIASTOLIC BLOOD PRESSURE: 67 MMHG | HEIGHT: 60 IN | HEART RATE: 71 BPM | WEIGHT: 162 LBS | SYSTOLIC BLOOD PRESSURE: 102 MMHG | BODY MASS INDEX: 31.8 KG/M2 | OXYGEN SATURATION: 96 %

## 2024-08-27 PROCEDURE — 37766 PHLEB VEINS - EXTREM 20+: CPT | Mod: RT

## 2024-08-27 NOTE — PROCEDURE
[FreeTextEntry1] : Right stab phlebectomy [FreeTextEntry3] : Procedural safety checklist and time out completed: Confirmed patient identification (Patient Name, , and/or medical record number including when possible affirmation by patient or parent/family/other. Confirmed procedure with the patient. Consent present, accurate and signed. Confirmed special equipment and supplies are present. Sterility confirmed. Position verified. Site/ side is marked and visible and confirmed. Procedure confirmed by consent. Accurate consent including side and site. Review of medical records noting correct procedure including site and side. MD/PA verifies presence and review of imaging studies and or written report of imaging studies. Specify equipment are available for the planned procedure. MD/PA has marked the patient's procedural site and side. Agreement on the procedure to be performed Time out completed. All of the above has been confirmed by the team. All patient-specific concerns have been addressed.   Indication:  Right lower extremity varicose veins with inflammation, leg pain, leg swelling, and leg cramping.    Procedure: Stab phlebectomy right lower extremity    Ms. BINH JEFFREY is a 53 year old F with a history of symptomatic right lower extremity varicose veins. A trial of compression stockings, exercise, elevation, and pain medication was attempted without relief and definitive treatment with microphlebectomy was offered.   I have discussed the risks of the procedure at length with the patient. The risks discussed were inclusive of but not limited to infection, irritation at the site of infiltration of local anesthesia, and also rare risk of deep venous thrombosis and pulmonary emboli. The patient agrees to proceed with the procedure. The patient was escorted into the procedure room, the varicose veins for treatment were marked out and the patient placed on the examination table. The entire limb was prepped and draped in sterile fashion and a time out was called.   Local anesthesia using 150 mL of tumescent (50 mL of 1% lidocaine in 250 mL of NaCl) using a 25 gauge needle over the previously marked prominent varicose vein sites. Multiple small stab incisions, each less than 1 cm in length was made at the noted sites. With the help of a vein hook, the vein was fished out at each of these sites, rolled over a narrow-tipped mosquito clamp and removed. Hemostasis was secured with leg elevation and application of manual pressure. After assuring hemostasis, a sterile 4x4 was placed on the access sites and an ACE compression wrap was applied. Estimated blood loss: minimal. Patient tolerated procedure well. Patient was given post-procedure instructions and follow up appointment was scheduled.   SIDE - RIGHT  SITE - CALF / THIGH LOCATION - MEDIAL / LATERAL / ANTERIOR / POSTERIOR  Total Stab Incisions >20

## 2024-09-03 ENCOUNTER — APPOINTMENT (OUTPATIENT)
Dept: VASCULAR SURGERY | Facility: CLINIC | Age: 54
End: 2024-09-03
Payer: MEDICAID

## 2024-09-03 DIAGNOSIS — I83.891 VARICOSE VEINS OF RIGHT LOWER EXTREMITY WITH OTHER COMPLICATIONS: ICD-10-CM

## 2024-09-03 PROCEDURE — 36475 ENDOVENOUS RF 1ST VEIN: CPT | Mod: LT,53,79

## 2024-09-04 NOTE — PROCEDURE
[FreeTextEntry1] : left AGSV RFA [FreeTextEntry3] : Procedural safety checklist and time out completed: Confirmed patient identification (Patient Name, , and/or medical record number including, when possible, affirmation by patient or parent/family/other). Confirmed procedure with the patient. Consent present, accurate and signed. Confirmed special equipment and supplies are present. Sterility confirmed. Position verified. Site/ side is marked and visible and confirmed. Procedure confirmed by consent. Accurate consent including side and site. Review of medical records, including venous ultrasound, noting correct procedure including site and side. MD/PA verifies presence and review of imaging studies and or written report of imaging studies. Agreement on the procedure to be performed. Time out completed. All of the above has been confirmed by the team. All patient-specific concerns have been addressed.   Indication: left lower extremity varicose veins with inflammation, leg pain, leg swelling, and leg cramping.  Venous insufficiency/ reflux.   Procedure: radiofrequency ablation of the left anterior great saphenous vein.   Ms. BINH JEFFREY is a 53 year old F with a history of left lower extremity varicose veins previously seen in the office.  Ultrasound examination demonstrated venous insufficiency. A trial of compression stockings, exercise, elevation, and pain medication was attempted without relief and definitive treatment with radiofrequency ablation was offered.   The patient has come for radiofrequency ablation treatment of the left anterior great saphenous vein. I have discussed the risks of the procedure at length with the patient. The risks discussed were inclusive of but not limited to infection, irritation at the site of infiltration of local anesthesia, and also rare risk of deep venous thrombosis and pulmonary emboli. The patient agrees to proceed with the procedure. The patient was escorted into the procedure room and a time out called. The entire limb was prepped and draped in sterile fashion. The RF fiber was placed on the sterile field and connected by a sterile cable. Actuation, temperature, and impedance testing were performed to ensure that all components were connected and operating properly. The patient was placed on the procedure table and local anesthesia was instilled in the skin overlying the access site. Under ultrasound guidance, the vein was punctured with a micropuncture needle, using the anterior wall technique. After multiple attempts to gain access of the left AGSV, the procedure was aborted. The L AGSV was too tortuous and short and complete the procedure.   Estimated Blood Loss: minimal. After assuring hemostasis, a sterile 4x4 was placed on the access site. Patient tolerated procedure well. Patient was given post-procedure instructions and follow up appointment was scheduled.

## 2024-09-09 ENCOUNTER — APPOINTMENT (OUTPATIENT)
Dept: VASCULAR SURGERY | Facility: CLINIC | Age: 54
End: 2024-09-09

## 2024-09-16 ENCOUNTER — APPOINTMENT (OUTPATIENT)
Dept: VASCULAR SURGERY | Facility: CLINIC | Age: 54
End: 2024-09-16
Payer: MEDICAID

## 2024-09-16 DIAGNOSIS — I83.891 VARICOSE VEINS OF RIGHT LOWER EXTREMITY WITH OTHER COMPLICATIONS: ICD-10-CM

## 2024-09-16 PROCEDURE — 99212 OFFICE O/P EST SF 10 MIN: CPT

## 2024-09-16 PROCEDURE — 93971 EXTREMITY STUDY: CPT

## 2024-09-25 NOTE — HISTORY OF PRESENT ILLNESS
[FreeTextEntry1] : BINH JEFFREY is a 51 year old female who presents for evaluation of varicose legs.   Patient states that she had lower extremity varicose veins for several years. The symptoms are worse on the right leg. Reports leg swelling, particularly at the end of the day. The patient reports pain at the varicose vein site but also in the calf. The pain is particularly worse at night. She has previously tried compression stockings but had to stop due to increased pain. No family history of varicose veins. No personal or family history of DVT.   + PMH: von Willebrand's, rheumatoid arthritis, graves disease, atrial fibrillation w/ loop monitor + PSH: s/p , s/p appendectomy, s/p cholecystectomy + FH: rheumatoid arthritis + SH: current daily smoker (1/2 ppd), no etoh use + Aller: augmentin, cefdinr, codeine, lidocaine, epinephrine, pcn, sulfa  PCP is Dr. Rick Bullock.  Rheumatologist is Dr. Davide Nguyen.   2023 - Pt presents for follow up. Continues to have pain in the right calf overlying the varicose veins. Since our last visit, patient has undergone office based procedure with lidocaine without any issues. Additionally, did follow up with allergist who stated no real allergy to lidocaine.   2023 - right GSV RFA 2023 - left GSV RFA  10/9/2023 - Pt presents for follow up after ablation procedures. She reports that her legs feel much better. She does not have the leg cramping at nighttime any more. She is able to sleep at night. Has persistent leg varicose veins with some discomfort.   2024 - Pt presents for follow up. Initially, improvement in her leg symptoms. More recently, worsening right leg varicose veins and with that, recurrence of right leg pain. Reports continued right leg discomfort with nighttime cramping. There is decreased swelling of the legs but has persistent varicose veins with pain. She describes having pain over the entire leg. Does not use compression stockings due to worsening pain with the compression. Patient also with chronic back pain and was previously advised for back surgery but patient has declined previously.  [de-identified] : 8/6/2024 - Right AGSV RFA 8/27/2024 - right stab phlebectomy 9/3/2024 - left AGSV RFA attempted, aborted  9/16/2024 - Presents for follow up. Continued symptoms left leg.

## 2024-09-25 NOTE — ASSESSMENT
[FreeTextEntry1] : BINH JEFFREY is a 53 year old female presents for evaluation of right lower extremity pain and varicose veins.  > Venous insufficiency, CEAP C2 - s/p bilateral GSV RFA in 2023 - s/p right AGSV RFA, stab 8/2024 - left AGSV RFA attempt. could not cannulate GSV. Repeat duplex today shows patent left AGSV with reasonable length for RFA. She is a candidate for procedure given symptoms despite conservative therapy. Will schedule for left AGSV RFA.

## 2024-09-25 NOTE — PHYSICAL EXAM
[Respiratory Effort] : normal respiratory effort [2+] : left 2+ [Ankle Swelling (On Exam)] : not present [Varicose Veins Of Lower Extremities] : present [Varicose Veins Of The Right Leg] : of the right leg [Ankle Swelling On The Left] : moderate [] : not present [Calm] : calm [de-identified] : Well-appearing  [de-identified] : EOMI, anicteric  [de-identified] : soft, nt, nd  [de-identified] : moves all extremities  [de-identified] : no wounds on bilateral feet [de-identified] : A&Ox4

## 2024-10-07 ENCOUNTER — APPOINTMENT (OUTPATIENT)
Dept: VASCULAR SURGERY | Facility: CLINIC | Age: 54
End: 2024-10-07

## 2024-11-07 RX ORDER — ALPRAZOLAM 0.5 MG/1
0.5 TABLET ORAL
Qty: 1 | Refills: 0 | Status: COMPLETED | COMMUNITY
Start: 2024-11-07 | End: 2024-11-12

## 2024-11-07 RX ORDER — LIDOCAINE HYDROCHLORIDE 10 MG/ML
1 INJECTION, SOLUTION INFILTRATION; PERINEURAL
Qty: 500 | Refills: 0 | Status: COMPLETED | COMMUNITY
Start: 2024-11-07 | End: 2024-11-12

## 2024-11-12 ENCOUNTER — APPOINTMENT (OUTPATIENT)
Dept: VASCULAR SURGERY | Facility: CLINIC | Age: 54
End: 2024-11-12
Payer: MEDICAID

## 2024-11-12 DIAGNOSIS — I83.891 VARICOSE VEINS OF RIGHT LOWER EXTREMITY WITH OTHER COMPLICATIONS: ICD-10-CM

## 2024-11-12 PROCEDURE — 36475 ENDOVENOUS RF 1ST VEIN: CPT | Mod: LT

## 2024-11-19 ENCOUNTER — NON-APPOINTMENT (OUTPATIENT)
Age: 54
End: 2024-11-19

## 2024-11-25 ENCOUNTER — APPOINTMENT (OUTPATIENT)
Dept: VASCULAR SURGERY | Facility: CLINIC | Age: 54
End: 2024-11-25
Payer: MEDICAID

## 2024-11-25 PROCEDURE — 93971 EXTREMITY STUDY: CPT | Mod: LT

## 2024-11-26 ENCOUNTER — NON-APPOINTMENT (OUTPATIENT)
Age: 54
End: 2024-11-26

## 2024-12-09 ENCOUNTER — APPOINTMENT (OUTPATIENT)
Dept: VASCULAR SURGERY | Facility: CLINIC | Age: 54
End: 2024-12-09
Payer: MEDICAID

## 2024-12-09 VITALS
HEART RATE: 76 BPM | HEIGHT: 60 IN | SYSTOLIC BLOOD PRESSURE: 118 MMHG | OXYGEN SATURATION: 99 % | WEIGHT: 150 LBS | DIASTOLIC BLOOD PRESSURE: 76 MMHG | TEMPERATURE: 97.9 F | BODY MASS INDEX: 29.45 KG/M2

## 2024-12-09 DIAGNOSIS — I83.891 VARICOSE VEINS OF RIGHT LOWER EXTREMITY WITH OTHER COMPLICATIONS: ICD-10-CM

## 2024-12-09 PROCEDURE — 99213 OFFICE O/P EST LOW 20 MIN: CPT
